# Patient Record
Sex: FEMALE | Race: WHITE | NOT HISPANIC OR LATINO | URBAN - METROPOLITAN AREA
[De-identification: names, ages, dates, MRNs, and addresses within clinical notes are randomized per-mention and may not be internally consistent; named-entity substitution may affect disease eponyms.]

---
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
## 2025-04-09 ENCOUNTER — OFFICE VISIT (OUTPATIENT)
Dept: OBGYN CLINIC | Facility: CLINIC | Age: 20
End: 2025-04-09
Payer: COMMERCIAL

## 2025-04-09 VITALS
WEIGHT: 115.4 LBS | SYSTOLIC BLOOD PRESSURE: 98 MMHG | DIASTOLIC BLOOD PRESSURE: 60 MMHG | BODY MASS INDEX: 21.79 KG/M2 | HEIGHT: 61 IN

## 2025-04-09 DIAGNOSIS — Z30.09 ENCOUNTER FOR GENERAL COUNSELING AND ADVICE ON CONTRACEPTIVE MANAGEMENT: Primary | ICD-10-CM

## 2025-04-09 PROCEDURE — 99213 OFFICE O/P EST LOW 20 MIN: CPT | Performed by: OBSTETRICS & GYNECOLOGY

## 2025-04-09 RX ORDER — RISPERIDONE 0.5 MG/1
0.5 TABLET ORAL
COMMUNITY
Start: 2025-04-01

## 2025-04-09 NOTE — PROGRESS NOTES
"Name: Surekha Bose      : 2005      MRN: [id number]  Encounter Provider: Judy Coello MD  Encounter Date: 2025   Encounter department: [facility]  :  Assessment & Plan  Encounter for general counseling and advice on contraceptive management             History of Present Illness   HPI  Surekha Bose is a 20 y.o. female who presents for discussion of IUD insertion.  She has had an IUD in the past and did well with it, specifically a Mirena.  He had it removed last year and gave birth in 2024.  She would like to have the Mirena placed again.  All of her questions were answered.  Will see her back following her next menstrual period.      Review of Systems       Objective   BP 98/60 (BP Location: Left arm, Patient Position: Sitting, Cuff Size: Adult)   Ht 5' 1\" (1.549 m)   Wt 52.3 kg (115 lb 6.4 oz)   LMP 2025 (Approximate)   Breastfeeding No   BMI 21.80 kg/m²      Physical Exam      "

## 2025-04-16 ENCOUNTER — TELEPHONE (OUTPATIENT)
Age: 20
End: 2025-04-16

## 2025-05-20 ENCOUNTER — PROCEDURE VISIT (OUTPATIENT)
Dept: OBGYN CLINIC | Facility: CLINIC | Age: 20
End: 2025-05-20
Payer: COMMERCIAL

## 2025-05-20 VITALS
HEIGHT: 61 IN | BODY MASS INDEX: 21.49 KG/M2 | SYSTOLIC BLOOD PRESSURE: 96 MMHG | WEIGHT: 113.8 LBS | DIASTOLIC BLOOD PRESSURE: 60 MMHG

## 2025-05-20 DIAGNOSIS — Z30.430 ENCOUNTER FOR INSERTION OF MIRENA IUD: Primary | ICD-10-CM

## 2025-05-20 LAB — SL AMB POCT URINE HCG: NORMAL

## 2025-05-20 PROCEDURE — 58300 INSERT INTRAUTERINE DEVICE: CPT | Performed by: OBSTETRICS & GYNECOLOGY

## 2025-05-20 PROCEDURE — 81025 URINE PREGNANCY TEST: CPT | Performed by: OBSTETRICS & GYNECOLOGY

## 2025-05-20 RX ORDER — SERTRALINE HYDROCHLORIDE 100 MG/1
100 TABLET, FILM COATED ORAL
COMMUNITY
Start: 2025-04-16

## 2025-05-20 NOTE — PROGRESS NOTES
IUD Procedure    Date/Time: 5/20/2025 1:27 PM    Performed by: Judy Coello MD  Authorized by: Judy Coello MD    Other Assisting Provider: No    Verbal consent obtained?: Yes    Written consent obtained?: Yes    Consent given by:  Patient  Time Out:     Time out: Immediately prior to the procedure a time out was called      Time out performed at:  5/20/2025 1:27 PM  Patient states understanding of procedure being performed: Yes    Patient's understanding of procedure matches consent: Yes    Procedure consent matches procedure scheduled: Yes    Relevant documents present and verified: Yes    Test results available and properly labeled: Yes    Site marked: No    Radiology Images displayed and confirmed. If images not available, report reviewed: No    Required items: Required blood products, implants, devices and special equipment available    Patient identity confirmed:  Verbally with patient  Select procedure: IUD insertion    IUD Insertion:     Pelvic exam performed: yes      Negative GC/chlamydia test: no      Negative urine pregnancy test: yes      Negative serum pregnancy test: no      Cervix cleaned and prepped: yes      Speculum placed in vagina: yes      Tenaculum applied to cervix: no      Allis applied to cervix: yes      IUD inserted with no complications: yes      Strings trimmed: yes      Uterus sounded: yes      Uterus sound depth (cm):  7    IUD type:  1 each Levonorgestrel 20 MCG/DAY  Post-procedure:     Patient tolerated procedure well: yes      Patient will follow up after next period: yes

## 2025-06-14 ENCOUNTER — HOSPITAL ENCOUNTER (EMERGENCY)
Facility: HOSPITAL | Age: 20
Discharge: HOME | End: 2025-06-15
Attending: EMERGENCY MEDICINE
Payer: COMMERCIAL

## 2025-06-14 VITALS
RESPIRATION RATE: 20 BRPM | HEART RATE: 74 BPM | OXYGEN SATURATION: 97 % | SYSTOLIC BLOOD PRESSURE: 141 MMHG | DIASTOLIC BLOOD PRESSURE: 63 MMHG | TEMPERATURE: 97.9 F

## 2025-06-14 DIAGNOSIS — F32.A DEPRESSION: ICD-10-CM

## 2025-06-14 DIAGNOSIS — F41.0 PANIC ATTACK: Primary | ICD-10-CM

## 2025-06-14 LAB
EXT PREGNANCY TEST URINE: NEGATIVE
EXT. CONTROL: NORMAL

## 2025-06-14 PROCEDURE — 99284 EMERGENCY DEPT VISIT MOD MDM: CPT

## 2025-06-14 PROCEDURE — 99284 EMERGENCY DEPT VISIT MOD MDM: CPT | Performed by: EMERGENCY MEDICINE

## 2025-06-14 PROCEDURE — 93005 ELECTROCARDIOGRAM TRACING: CPT

## 2025-06-14 PROCEDURE — 81025 URINE PREGNANCY TEST: CPT | Performed by: EMERGENCY MEDICINE

## 2025-06-15 LAB
ATRIAL RATE: 78 BPM
P AXIS: 60 DEGREES
PR INTERVAL: 124 MS
QRS AXIS: 64 DEGREES
QRSD INTERVAL: 82 MS
QT INTERVAL: 386 MS
QTC INTERVAL: 440 MS
T WAVE AXIS: 54 DEGREES
VENTRICULAR RATE: 78 BPM

## 2025-06-15 PROCEDURE — 93010 ELECTROCARDIOGRAM REPORT: CPT | Performed by: INTERNAL MEDICINE

## 2025-06-15 NOTE — ED ATTENDING ATTESTATION
6/14/2025  I, Jose Smith MD, saw and evaluated the patient. I have discussed the patient with the resident/non-physician practitioner and agree with the resident's/non-physician practitioner's findings, Plan of Care, and MDM as documented in the resident's/non-physician practitioner's note, except where noted. All available labs and Radiology studies were reviewed.  I was present for key portions of any procedure(s) performed by the resident/non-physician practitioner and I was immediately available to provide assistance.       At this point I agree with the current assessment done in the Emergency Department.  I have conducted an independent evaluation of this patient a history and physical is as follows:  Briefly, 20-year-old female presenting with episode of lightheadedness as well as shortness of breath.  Patient is in normal state of health, was riding in a car with her friend, suddenly felt very panicked, short of breath, and lightheaded.  Patient was breathing fast, also notes she was leaning on her left arm on the console of the car.  Patient began feeling numb in the left arm from the elbow down, also states she had trouble moving the arm.  Her friend drove her home, made her some food, patient seemed to improve with same.  Numbness and weakness in the arm resolved gradually, accompanied by some pins-and-needles, now has completely resolved.  Other symptoms likewise resolved.  On examination, heart sounds normal, lungs clear to auscultation,  5 out of 5 strength in all extremities, normal speech and coordination, no numbness in either upper extremity.  EKG reassuring, normal sinus rhythm without evidence of acute ischemia or significant arrhythmia.  Overall, suspect combination of panic attack along with possible benign neuropraxia from leaning on the arm causing her to fall asleep.  Do not suspect ACS, PE, aortic dissection, bacterial pneumonia, pneumothorax, stroke, other acute life threat.   Discharged with strict return precautions, follow-up primary care doctor  ED Course         Critical Care Time  Procedures

## 2025-06-15 NOTE — ED PROVIDER NOTES
Time reflects when diagnosis was documented in both MDM as applicable and the Disposition within this note       Time User Action Codes Description Comment    6/14/2025 11:21 PM Michel Romero [F41.0] Panic attack     6/14/2025 11:24 PM Michel Romero [F32.A] Depression           ED Disposition       ED Disposition   Discharge    Condition   Stable    Date/Time   Sat Jun 14, 2025 11:20 PM    Comment   Surekha Bose discharge to home/self care.                   Assessment & Plan       Medical Decision Making  20-year-old female presents with dizziness.  Past medical/psychiatric history significant for concussion, depression and anxiety. She presents after experiencing an episode of sudden onset lightheadedness, nausea, hot flashes, mild chest pain, hyperventilation, tightness and numbness of right upper extremity from the hand to the elbow, blackening vision, and difficulty walking.  During this episode, patient reports that she experienced a sudden sense of dread.  Friend was present at the episode to witness.  Both patient and friend report that the episode lasted for about 1 hour before patient was able to regain normal functioning.  Patient denies any loss of consciousness or head strike during the episode.  Upon ED arrival, vital signs are stable within normal limits.  Physical exam was unremarkable.  EKG was unremarkable.  Pregnancy test was negative.    Differential diagnosis at this time includes panic attack versus functional neurologic disturbance.  Patient was asymptomatic throughout ED course.  Patient is to be discharged home.  Patient reports following currently with a counselor.  Patient was encouraged to review coping strategies with counselor.  Patient was also instructed to schedule appointment with psychiatry, for which referral has been placed.  Patient was also encouraged to follow up with PCP as well.  Patient verbalized understanding and agreement with the plan.    Amount and/or Complexity of  Data Reviewed  Labs: ordered.  ECG/medicine tests: ordered.             Medications - No data to display    ED Risk Strat Scores                    No data recorded        SBIRT 22yo+      Flowsheet Row Most Recent Value   Initial Alcohol Screen: US AUDIT-C     1. How often do you have a drink containing alcohol? 0 Filed at: 06/14/2025 2127   2. How many drinks containing alcohol do you have on a typical day you are drinking?  0 Filed at: 06/14/2025 2127   3b. FEMALE Any Age, or MALE 65+: How often do you have 4 or more drinks on one occassion? 0 Filed at: 06/14/2025 2127   Audit-C Score 0 Filed at: 06/14/2025 2127   DINESH: How many times in the past year have you...    Used an illegal drug or used a prescription medication for non-medical reasons? Never Filed at: 06/14/2025 2127                            History of Present Illness       Chief Complaint   Patient presents with    Dizziness     Lightheadedness, right arm was tingling. This was during a panic attack. Patients visitor states that she was breathing fast.        Past Medical History[1]   Past Surgical History[2]   Family History[3]   Social History[4]   E-Cigarette/Vaping    E-Cigarette Use Current Every Day User       E-Cigarette/Vaping Substances    Nicotine Yes     THC No     CBD No     Flavoring Yes     Other No     Unknown No       I have reviewed and agree with the history as documented.     20-year-old female presents with dizziness.  Past medical/psychiatric history significant for concussion, depression and anxiety.  She was recently hospitalized at a inpatient psychiatric facility a few months ago for suicide ideation.  Currently, she presents after experiencing an episode of sudden onset lightheadedness, nausea, hot flashes, mild chest pain, hyperventilation, tightness and numbness of right upper extremity from the hand to the elbow, blackening vision, and difficulty walking.  During this episode, patient reports that she experienced a sudden  sense of dread.  Friend was present at the episode to witness.  Both patient and friend report that the episode lasted for about 1 hour before patient was able to regain normal functioning.  Patient denies any loss of consciousness or head strike during the episode. Patient denies having experienced any similar episode in the past.  Per review of systems, patient denies any headaches, current vision changes, loss of appetite, current lightheadedness, current focal motor or sensory deficits, chest pain, palpitations, shortness of breath, abdominal pain, nausea or vomiting diarrhea, lower extremity swelling or pain, or urinary symptoms.  She reports that her mood is fine and denies any active or passive SI or HI.      History provided by:  Patient and friend  Dizziness  Associated symptoms: chest pain, nausea, shortness of breath and weakness    Associated symptoms: no diarrhea, no headaches, no hearing loss, no palpitations and no vomiting        Review of Systems   Constitutional:  Positive for diaphoresis. Negative for activity change, appetite change, chills, fatigue and fever.   HENT:  Negative for congestion, hearing loss, sinus pain and trouble swallowing.    Eyes:  Positive for visual disturbance. Negative for photophobia.   Respiratory:  Positive for shortness of breath. Negative for cough, choking, chest tightness and wheezing.    Cardiovascular:  Positive for chest pain. Negative for palpitations and leg swelling.   Gastrointestinal:  Positive for nausea. Negative for diarrhea and vomiting.   Genitourinary:  Negative for difficulty urinating, dysuria and urgency.   Musculoskeletal:  Positive for gait problem. Negative for arthralgias and back pain.   Skin:  Negative for color change and rash.   Neurological:  Positive for dizziness, weakness, light-headedness and numbness. Negative for seizures, syncope, speech difficulty and headaches.   Psychiatric/Behavioral:  Negative for agitation and behavioral  problems.            Objective       ED Triage Vitals [06/14/25 2128]   Temperature Pulse Blood Pressure Respirations SpO2 Patient Position - Orthostatic VS   97.9 °F (36.6 °C) 74 141/63 20 97 % Sitting      Temp Source Heart Rate Source BP Location FiO2 (%) Pain Score    Oral -- Right arm -- --      Vitals      Date and Time Temp Pulse SpO2 Resp BP Pain Score FACES Pain Rating User   06/14/25 2128 97.9 °F (36.6 °C) 74 97 % 20 141/63 -- -- ND            Physical Exam  Vitals reviewed.   Constitutional:       General: She is not in acute distress.     Appearance: Normal appearance. She is not ill-appearing, toxic-appearing or diaphoretic.   HENT:      Head: Normocephalic and atraumatic.      Nose: Nose normal. No congestion.      Mouth/Throat:      Mouth: Mucous membranes are moist.      Pharynx: Oropharynx is clear. No oropharyngeal exudate or posterior oropharyngeal erythema.     Eyes:      Extraocular Movements: Extraocular movements intact.      Conjunctiva/sclera: Conjunctivae normal.      Pupils: Pupils are equal, round, and reactive to light.       Cardiovascular:      Rate and Rhythm: Normal rate and regular rhythm.      Pulses: Normal pulses.      Heart sounds: Normal heart sounds. No murmur heard.  Pulmonary:      Effort: Pulmonary effort is normal. No respiratory distress.      Breath sounds: Normal breath sounds. No wheezing or rales.   Abdominal:      General: There is no distension.      Palpations: Abdomen is soft.      Tenderness: There is no abdominal tenderness. There is no guarding or rebound.     Musculoskeletal:         General: No swelling or tenderness. Normal range of motion.      Cervical back: Normal range of motion and neck supple.      Right lower leg: No edema.      Left lower leg: No edema.     Skin:     General: Skin is warm.      Capillary Refill: Capillary refill takes less than 2 seconds.      Coloration: Skin is not jaundiced or pale.     Neurological:      General: No focal deficit  present.      Mental Status: She is alert and oriented to person, place, and time. Mental status is at baseline.      Cranial Nerves: No cranial nerve deficit.      Sensory: No sensory deficit.      Motor: No weakness.      Coordination: Coordination normal.      Gait: Gait normal.     Psychiatric:         Mood and Affect: Mood normal.         Behavior: Behavior normal.         Thought Content: Thought content normal.         Judgment: Judgment normal.         Results Reviewed       Procedure Component Value Units Date/Time    POCT pregnancy, urine [[id number]]  (Normal) Collected: 25    Lab Status: Final result Updated: 25     EXT Preg Test, Ur Negative     Control Valid            No orders to display       Procedures    ED Medication and Procedure Management   Prior to Admission Medications   Prescriptions Last Dose Informant Patient Reported? Taking?   Norethin-Eth Estrad-Fe Biphas 1 MG-10 MCG / 10 MCG TABS   No No   Sig: Take 1 tablet by mouth daily for 28 days   Patient not taking: Reported on 3/31/2021   betamethasone, augmented, (DIPROLENE) 0.05 % ointment   No No   Sig: Apply topically 2 (two) times a day   Patient not taking: Reported on 4/15/2021   clindamycin (CLEOCIN) 2 % vaginal cream   No No   Sig: Insert 1 applicator into the vagina daily at bedtime   Patient not taking: Reported on 2025   levonorgestrel (MIRENA) 20 MCG/24HR IUD   Yes No   Si each by Intrauterine route once   Patient not taking: Reported on 2025   risperiDONE (RisperDAL) 0.5 mg tablet 2025  Yes Yes   Sig: Take 0.5 mg by mouth daily at bedtime   sertraline (ZOLOFT) 100 mg tablet 2025  Yes Yes   Sig: Take 100 mg by mouth daily at bedtime   sertraline (ZOLOFT) 25 mg tablet   Yes No   Sig: Take 25 mg by mouth daily   sertraline (ZOLOFT) 50 mg tablet   Yes No   Sig: Take 50 mg by mouth daily   Patient not taking: Reported on 3/9/2022       Facility-Administered Medications: None     Patient's  Medications   Discharge Prescriptions    No medications on file       ED SEPSIS DOCUMENTATION   Time reflects when diagnosis was documented in both MDM as applicable and the Disposition within this note       Time User Action Codes Description Comment    6/14/2025 11:21 PM Michel Romero [F41.0] Panic attack     6/14/2025 11:24 PM Michel Romero [F32.A] Depression           This note has been constructed using a voice recognition system. There may be translation, syntax, or grammatical errors. If you have any questions, please contact the dictating author.    Michel Romero MD  PGY-1           [1]   Past Medical History:  Diagnosis Date    Depression    [2] No past surgical history on file.  [3]   Family History  Family history unknown: Yes   [4]   Social History  Tobacco Use    Smoking status: Never    Smokeless tobacco: Never   Vaping Use    Vaping status: Every Day    Substances: Nicotine, Flavoring   Substance Use Topics    Alcohol use: Not Currently    Drug use: Not Currently        Michel Romero MD  06/14/25 [id number]

## 2025-06-15 NOTE — DISCHARGE INSTRUCTIONS
Continue to follow up with your counselor to work on strategies to prevent panic attacks. Please schedule appointment and follow up with primary care provider or psychiatrist to manage medications.

## 2025-07-01 ENCOUNTER — TELEPHONE (OUTPATIENT)
Age: 20
End: 2025-07-01

## 2025-07-01 NOTE — TELEPHONE ENCOUNTER
Contacted Pt. in regards to ROUTINE Referral,not able to LVM to contact [phone] to discuss services needed at this time in order to be added to proper wait list, someone answered and disconnected

## 2025-07-28 ENCOUNTER — OFFICE VISIT (OUTPATIENT)
Dept: OBGYN CLINIC | Facility: CLINIC | Age: 20
End: 2025-07-28
Payer: COMMERCIAL

## 2025-07-28 VITALS
DIASTOLIC BLOOD PRESSURE: 62 MMHG | SYSTOLIC BLOOD PRESSURE: 108 MMHG | WEIGHT: 116.8 LBS | BODY MASS INDEX: 22.05 KG/M2 | HEIGHT: 61 IN

## 2025-07-28 DIAGNOSIS — N92.1 BREAKTHROUGH BLEEDING WITH IUD: Primary | ICD-10-CM

## 2025-07-28 DIAGNOSIS — Z30.431 ENCOUNTER FOR ROUTINE CHECKING OF INTRAUTERINE CONTRACEPTIVE DEVICE (IUD): ICD-10-CM

## 2025-07-28 DIAGNOSIS — R10.2 PELVIC PAIN: ICD-10-CM

## 2025-07-28 DIAGNOSIS — Z97.5 BREAKTHROUGH BLEEDING WITH IUD: Primary | ICD-10-CM

## 2025-07-28 PROBLEM — H51.11 CONVERGENCE INSUFFICIENCY: Status: ACTIVE | Noted: 2019-10-28

## 2025-07-28 PROBLEM — F33.2 SEVERE EPISODE OF RECURRENT MAJOR DEPRESSIVE DISORDER, WITHOUT PSYCHOTIC FEATURES (HCC): Status: ACTIVE | Noted: 2024-08-23

## 2025-07-28 PROBLEM — F17.200 NICOTINE USE DISORDER: Status: ACTIVE | Noted: 2024-08-24

## 2025-07-28 PROBLEM — F41.9 ANXIETY: Status: ACTIVE | Noted: 2019-03-05

## 2025-07-28 PROBLEM — S06.0X0A CONCUSSION WITHOUT LOSS OF CONSCIOUSNESS: Status: ACTIVE | Noted: 2019-08-21

## 2025-07-28 LAB — SL AMB POCT URINE HCG: NORMAL

## 2025-07-28 PROCEDURE — 81025 URINE PREGNANCY TEST: CPT | Performed by: PHYSICIAN ASSISTANT

## 2025-07-28 PROCEDURE — 99214 OFFICE O/P EST MOD 30 MIN: CPT | Performed by: PHYSICIAN ASSISTANT
